# Patient Record
Sex: FEMALE | Race: OTHER
[De-identification: names, ages, dates, MRNs, and addresses within clinical notes are randomized per-mention and may not be internally consistent; named-entity substitution may affect disease eponyms.]

---

## 2017-12-30 ENCOUNTER — HOSPITAL ENCOUNTER (INPATIENT)
Dept: HOSPITAL 36 - ER | Age: 57
LOS: 3 days | Discharge: HOME | DRG: 871 | End: 2018-01-02
Attending: FAMILY MEDICINE | Admitting: FAMILY MEDICINE
Payer: MEDICARE

## 2017-12-30 DIAGNOSIS — M06.9: ICD-10-CM

## 2017-12-30 DIAGNOSIS — I10: ICD-10-CM

## 2017-12-30 DIAGNOSIS — Z82.49: ICD-10-CM

## 2017-12-30 DIAGNOSIS — J18.9: ICD-10-CM

## 2017-12-30 DIAGNOSIS — J45.909: ICD-10-CM

## 2017-12-30 DIAGNOSIS — F41.9: ICD-10-CM

## 2017-12-30 DIAGNOSIS — E78.5: ICD-10-CM

## 2017-12-30 DIAGNOSIS — Z88.0: ICD-10-CM

## 2017-12-30 DIAGNOSIS — Z88.1: ICD-10-CM

## 2017-12-30 DIAGNOSIS — A41.9: Primary | ICD-10-CM

## 2017-12-30 DIAGNOSIS — G89.29: ICD-10-CM

## 2017-12-30 DIAGNOSIS — M54.9: ICD-10-CM

## 2017-12-30 LAB
ALBUMIN SERPL-MCNC: 4.5 GM/DL (ref 3.7–5.3)
ALBUMIN/GLOB SERPL: 1.4 {RATIO} (ref 1–1.8)
ALP SERPL-CCNC: 62 U/L (ref 34–104)
ALT SERPL-CCNC: 86 U/L (ref 7–52)
ANION GAP SERPL CALC-SCNC: 13.6 MMOL/L (ref 7–16)
AST SERPL-CCNC: 41 U/L (ref 13–39)
BILIRUB SERPL-MCNC: 0.4 MG/DL (ref 0.3–1)
BUN SERPL-MCNC: 25 MG/DL (ref 7–25)
CALCIUM SERPL-MCNC: 9.7 MG/DL (ref 8.6–10.3)
CHLORIDE SERPL-SCNC: 100 MEQ/L (ref 98–107)
CHOLEST SERPL-MCNC: 142 MG/DL (ref ?–200)
CK SERPL-CCNC: 87 U/L (ref 30–223)
CO2 SERPL-SCNC: 24.7 MEQ/L (ref 21–31)
CREAT SERPL-MCNC: 1.4 MG/DL (ref 0.6–1.2)
D DIMER PPP FEU-MCNC: 1380 NG/ML (ref 100–400)
EOSINOPHIL NFR BLD: 12 % (ref 0–5)
ERYTHROCYTE [DISTWIDTH] IN BLOOD BY AUTOMATED COUNT: 14 % (ref 11.5–20)
GLOBULIN SER-MCNC: 3.3 GM/DL
GLUCOSE SERPL-MCNC: 119 MG/DL (ref 70–105)
HCT VFR BLD CALC: 40.1 % (ref 41–60)
HDLC SERPL-MCNC: 32 MG/DL (ref 23–92)
HGB BLD-MCNC: 13.5 GM/DL (ref 12–16)
INR PPP: 0.95 (ref 0.5–1.4)
LYMPHOCYTES # BLD MANUAL: 6 % (ref 20–50)
MANUAL DIFFERENTIAL PERFORMED BLD QL: YES
MCH RBC QN AUTO: 31.1 PG (ref 27–31)
MCHC RBC AUTO-ENTMCNC: 33.6 PG (ref 28–36)
MCV RBC AUTO: 92.6 FL (ref 81–100)
MONOCYTES # BLD MANUAL: 7 % (ref 2–10)
NEUTROPHILS NFR BLD AUTO: 75 % (ref 40–80)
PLATELET # BLD: 400 TH/CMM (ref 150–400)
PMV BLD AUTO: 7.7 FL
POTASSIUM SERPL-SCNC: 4.3 MEQ/L (ref 3.5–5.1)
PROTHROMBIN TIME: 9.9 SECONDS (ref 9.5–11.5)
RBC # BLD AUTO: 4.34 MIL/CMM (ref 3.8–5.1)
SODIUM SERPL-SCNC: 134 MEQ/L (ref 136–145)
TRIGL SERPL-MCNC: 262 MG/DL (ref ?–150)
WBC # BLD AUTO: 16.7 TH/CMM (ref 4.8–10.8)

## 2017-12-30 PROCEDURE — Z7610: HCPCS

## 2017-12-30 RX ADMIN — ENOXAPARIN SODIUM SCH MG: 40 INJECTION SUBCUTANEOUS at 23:07

## 2017-12-30 RX ADMIN — HYDROCODONE BITARTRATE AND ACETAMINOPHEN PRN TAB: 5; 325 TABLET ORAL at 22:06

## 2017-12-30 RX ADMIN — IPRATROPIUM BROMIDE AND ALBUTEROL SULFATE SCH ML: .5; 3 SOLUTION RESPIRATORY (INHALATION) at 20:45

## 2017-12-30 RX ADMIN — POTASSIUM CHLORIDE, DEXTROSE MONOHYDRATE AND SODIUM CHLORIDE SCH MLS/HR: 150; 5; 450 INJECTION, SOLUTION INTRAVENOUS at 21:06

## 2017-12-30 NOTE — ED PHYSICIAN CHART
ED Chief Complaint/HPI





- Patient Information


Date Seen:: 12/30/17


Time Seen:: 16:15


Chief Complaint:: Dyspnea


History of Present Illness:: 





onset x one day of dyspnea with a 3 day history of fever, cough, and congestion

; no report of H/As, S/T, neck pain, C/P, Abd. Pain, A/N/V/D/C, chills, or 

urinary s/s


Historian:: Patient, EMS


Review:: Old Chart Reviewed





ED Review of Systems





- Review of Systems


General/Constitutional: Fever, No chills, No weight loss, No weakness, No 

diaphoresis, No edema, No loss of appetite


Skin: No skin lesions, No rash, No bruising


Head: No headache, No light-headedness


Eyes: No loss of vision, No pain, No diplopia


ENT: No earache, Nasal drainage, No sore throat, No tinnitus


Neck: No neck pain, No swelling, No thyromegaly, No stiffness, No mass noted


Cardio Vascular: No chest pain, No palpitations, No PND, No orthopnea, No edema


Pulmonary: SOB, Cough, No sputum, No wheezing


GI: No nausea, No vomiting, No diarrhea, No pain, No melena, No hematochezia, 

No constipation, No hematemesis


G/U: No dysuria, No frequency, No hematuria


Ob/Gyn: No vaginal discharge, No abnormal vaginal bleed, No contraction


Musculoskeletal: No bone or joint pain, No back pain, No muscle pain


Endocrine: No polyuria, No polydipsia


Psychiatric: No prior psych history, No depression, No anxiety, No suicidal 

ideation, No homicidal ideation, No auditory hallucination, No visual 

hallucination


Hematopoietic: No bruising, No lymphadenopathy


Allergic/Immuno: No urticaria, No angioedema


Neurological: No syncope, No focal symptoms, No weakness, No paresthesia, No 

headache, No seizure, No dizziness, No confusion, No vertigo





ED Past Medical History





- Past Medical History


Obtainable: Yes


Past Medical History: HTN


Family History: HTN


Social History: Non Smoker, No Alcohol, No Drug Use, Single, Care Facility


Surgical History: None


Psychiatricy History: None


Medication: Reviewed





ED Physical Exam





- Physical Examination


General/Constitutional: Awake, Well-developed, well-nourished, Alert, No 

distress, GCS 15, Non-toxic appearing, Ambulatory


Head: Atraumatic


Eyes: Lids, conjuctiva normal, PERRL, EOMI


Skin: Nl inspection, No rash, No skin lesions, No ecchymosis, Well hydrated, No 

lymphadenopathy


ENMT: External ears, nose nl, TM canals nl, Nasal exam nl, Lips, teeth, gums nl

, Oropharynx nl, Tonsils nl


Neck: Nontender, Full ROM w/o pain, No JVD, No nuchal rigidity, No bruit, No 

mass, No stridor


Respiratory: Nl effort/Exclusion


Other Respiratory comments:: 





Lungs: + Rales and Rhonchi


Cardio Vascular: RRR, No murmur, gallop, rubs, NL S1 S2, Carotid/Femoral/Distal 

pulses equal bilaterally


GI: No tenderness/rebounding/guarding, No organomegaly, No hernia, Normal BS's, 

Nondistended, No mass/bruits, No McBurney tenderness


: No CVA tenderness


Extremities: No tenderness or effusion, Full ROM, normal strength in all 

extremities, No edema, Normal digits & nails


Neuro/Psych: Alert/oriented, DTR's symmetric, Normal sensory exam, Normal motor 

strength, Judgement/insight normal, Mood normal, Normal gait, No focal deficits


Misc: Normal back, No paraspinal tenderness





ED Labs/Radiology/EKG Results





- Lab Results


Comments:: 





WBC: 16.7





- Radiology Results


Comments:: 





+ RML Infiltrate





- EKG Interpretations


EKG Time:: 16:38


Rate & Rhythm: 127; ST


Comments:: 





non-specific st-t changes





ED Septic Shock





- .


Is Septic Shock (SBP<90, OR Lactate>4 mmol\L) present?: No





ED Reassessment (Disposition)





- Reassessment


Reassessment Condition:: Improved





- Diagnosis


Diagnosis:: 





Fever; Leukocytosis; Sepsis; PNA; Cough/Congestion; Dyspnea





- Aftercare/Follow up Instructions


Aftercare/Follow-Up Instructions:: Counseled pt regarding lab results/diagnosis 

& need follow up, Counseled pt & family regarding lab results/diagnosis & need 

follow up





- Patient Disposition


Discharge/Transfer:: Acute Care w/in this hosp


Accepting Physician:: Dr. barrett


Time Called:: 1815


Time Responded:: 18:15


Admitted to:: Telemetry


Spoke to:: Dr. Barrett


Admitting Medical Physician:: Dr. Kadhium


Condition at Disposition:: Stable, Improved

## 2017-12-30 NOTE — HISTORY & PHYSICAL
ADMIT DATE:  12/30/2017



CHIEF COMPLAINT:  Cough and congestion for a few days' duration.



HISTORY OF PRESENT ILLNESS:  The patient is a 57-year-old female with a long

history of asthma and chronic back pain, presented to the Emergency Room with

cough and congestion for a few days' duration, evaluated by the physician. 

Workup sent for pneumonia, admitted to medical floor, started on IV fluid and

antibiotic.  Denies any nausea, any vomiting, chest pain.  No dysuria or

hematuria.



PAST MEDICAL HISTORY:  Significant for asthma and chronic back pain.



PAST SURGICAL HISTORY:  Back surgery.



ALLERGIES:  ERYTHROMYCIN AND PENICILLIN.



SOCIAL HISTORY:  No smoking, no alcohol, no drug.



FAMILY HISTORY:  Noncontributory.



REVIEW OF SYSTEMS:

IMMUNOSYSTEM:  No history of chronic renal disorder.

CARDIOVASCULAR SYSTEM:  Coronary artery disease.

ENDOCRINE SYSTEM:  No diabetes or thyroid problem.

GASTROINTESTINAL SYSTEM:  No upper or lower gastrointestinal bleed.

NEUROLOGICAL SYSTEM:  No seizure disorder.

SKELETOMUSCULAR SYSTEM:  No muscular dystrophy.

RESPIRATORY SYSTEM:  She has history of asthma.



PHYSICAL EXAMINATION:

GENERAL:  She is awake, alert, oriented, mildly congested, not in distress.

VITAL SIGNS:  Temperature 100.5, heart rate 118 and blood pressure 121/66.

HEENT:  Normocephalic.  Pupils equal, reactive to light and accommodation. 

Sclerae clear.

NECK:  Supple.  Negative for lymphadenopathy, JVD or bruit.

CHEST:  Entry of air bilateral diminished associated with rhonchi.

HEART:  S1 and S2 normal.  No gallop rhythm.

ABDOMEN:  Soft and bowel sounds positive.

EXTREMITIES:  No edema.

NEUROLOGIC:  She is awake, alert and oriented.  No focal motor or sensory

deficits.  Cranial nerves 2-12 intact.



LABORATORY DATA:  White blood cell 16.7, hemoglobin 13.5, hematocrit 40.1 and

platelet 400.  PT 9.9, INR 0.95.  Sodium 134, potassium 4.3, BUN 25, creatinine

0.4 and glucose 119.



ASSESSMENT:

1.  Pneumonia.

2.  Asthma.

3.  Chronic back pain.

4.  Hyperlipidemia.



PLAN:  The patient is in the hospital under Dr. Barrett's service, started on IV

fluid, antibiotic and breathing treatment.  The patient will resume her home

medication.  Influenza A and B screen ordered.  The patient is a full code. 

Lovenox 40 subcutaneous daily ordered.





DD: 12/30/2017 22:39

DT: 12/30/2017 23:47

JOB# [id number]  [id number]

## 2017-12-31 LAB
ALBUMIN SERPL-MCNC: 3.8 GM/DL (ref 3.7–5.3)
ALBUMIN/GLOB SERPL: 1.4 {RATIO} (ref 1–1.8)
ALP SERPL-CCNC: 48 U/L (ref 34–104)
ALT SERPL-CCNC: 58 U/L (ref 7–52)
ANION GAP SERPL CALC-SCNC: 13.8 MMOL/L (ref 7–16)
APPEARANCE UR: CLEAR
AST SERPL-CCNC: 23 U/L (ref 13–39)
BACTERIA #/AREA URNS HPF: (no result) /HPF
BASOPHILS # BLD AUTO: 0 TH/CUMM (ref 0–0.2)
BASOPHILS NFR BLD AUTO: 0.1 % (ref 0–2)
BILIRUB SERPL-MCNC: 0.4 MG/DL (ref 0.3–1)
BILIRUB UR-MCNC: NEGATIVE MG/DL
BUN SERPL-MCNC: 31 MG/DL (ref 7–25)
CALCIUM SERPL-MCNC: 8.5 MG/DL (ref 8.6–10.3)
CHLORIDE SERPL-SCNC: 100 MEQ/L (ref 98–107)
CO2 SERPL-SCNC: 25 MEQ/L (ref 21–31)
COLOR UR: YELLOW
CREAT SERPL-MCNC: 2.2 MG/DL (ref 0.6–1.2)
EOSINOPHIL # BLD AUTO: 2.6 TH/CMM (ref 0.1–0.4)
EOSINOPHIL NFR BLD AUTO: 16.5 % (ref 0–5)
EPI CELLS URNS QL MICRO: (no result) /LPF
ERYTHROCYTE [DISTWIDTH] IN BLOOD BY AUTOMATED COUNT: 13.8 % (ref 11.5–20)
FLUAV AG NPH QL IA: (no result)
FLUBV AG NPH QL IA: (no result)
GLOBULIN SER-MCNC: 2.7 GM/DL
GLUCOSE SERPL-MCNC: 114 MG/DL (ref 70–105)
GLUCOSE UR STRIP-MCNC: NEGATIVE MG/DL
HCT VFR BLD CALC: 34.3 % (ref 41–60)
HGB BLD-MCNC: 11.9 GM/DL (ref 12–16)
KETONES UR STRIP-MCNC: NEGATIVE MG/DL
LEUKOCYTE ESTERASE UR-ACNC: NEGATIVE
LYMPHOCYTE AB SER FC-ACNC: 1.6 TH/CMM (ref 1.5–3)
LYMPHOCYTES NFR BLD AUTO: 10.3 % (ref 20–50)
MCH RBC QN AUTO: 32.1 PG (ref 27–31)
MCHC RBC AUTO-ENTMCNC: 34.8 PG (ref 28–36)
MCV RBC AUTO: 92.4 FL (ref 81–100)
MICRO URNS: YES
MONOCYTES # BLD AUTO: 1.3 TH/CMM (ref 0.3–1)
MONOCYTES NFR BLD AUTO: 8 % (ref 2–10)
NEUTROPHILS # BLD: 10.4 TH/CMM (ref 1.8–8)
NEUTROPHILS NFR BLD AUTO: 65.1 % (ref 40–80)
NITRITE UR QL STRIP: NEGATIVE
PH UR STRIP: 6 [PH] (ref 4.6–8)
PLATELET # BLD: 350 TH/CMM (ref 150–400)
PMV BLD AUTO: 8.2 FL
POTASSIUM SERPL-SCNC: 3.8 MEQ/L (ref 3.5–5.1)
PROT UR STRIP-MCNC: NEGATIVE MG/DL
RBC # BLD AUTO: 3.71 MIL/CMM (ref 3.8–5.1)
RBC # UR STRIP: NEGATIVE /UL
RBC #/AREA URNS HPF: (no result) /HPF (ref 0–5)
SODIUM SERPL-SCNC: 135 MEQ/L (ref 136–145)
SP GR UR STRIP: 1.02 (ref 1–1.03)
URINALYSIS COMPLETE PNL UR: (no result)
UROBILINOGEN UR STRIP-ACNC: 0.2 E.U./DL (ref 0.2–1)
WBC # BLD AUTO: 15.9 TH/CMM (ref 4.8–10.8)
WBC #/AREA URNS HPF: (no result) /HPF (ref 0–5)

## 2017-12-31 RX ADMIN — IPRATROPIUM BROMIDE AND ALBUTEROL SULFATE SCH ML: .5; 3 SOLUTION RESPIRATORY (INHALATION) at 19:19

## 2017-12-31 RX ADMIN — HYDROCODONE BITARTRATE AND ACETAMINOPHEN PRN TAB: 5; 325 TABLET ORAL at 23:31

## 2017-12-31 RX ADMIN — ENOXAPARIN SODIUM SCH MG: 40 INJECTION SUBCUTANEOUS at 22:33

## 2017-12-31 RX ADMIN — HYDROCODONE BITARTRATE AND ACETAMINOPHEN PRN TAB: 5; 325 TABLET ORAL at 12:41

## 2017-12-31 RX ADMIN — POTASSIUM CHLORIDE, DEXTROSE MONOHYDRATE AND SODIUM CHLORIDE SCH MLS/HR: 150; 5; 450 INJECTION, SOLUTION INTRAVENOUS at 21:58

## 2017-12-31 RX ADMIN — IPRATROPIUM BROMIDE AND ALBUTEROL SULFATE SCH ML: .5; 3 SOLUTION RESPIRATORY (INHALATION) at 01:24

## 2017-12-31 RX ADMIN — ENOXAPARIN SODIUM SCH MG: 40 INJECTION SUBCUTANEOUS at 10:16

## 2017-12-31 RX ADMIN — IPRATROPIUM BROMIDE AND ALBUTEROL SULFATE SCH ML: .5; 3 SOLUTION RESPIRATORY (INHALATION) at 07:23

## 2017-12-31 RX ADMIN — LEVOFLOXACIN SCH MLS/HR: 5 INJECTION INTRAVENOUS at 18:13

## 2017-12-31 RX ADMIN — POTASSIUM CHLORIDE, DEXTROSE MONOHYDRATE AND SODIUM CHLORIDE SCH MLS/HR: 150; 5; 450 INJECTION, SOLUTION INTRAVENOUS at 18:18

## 2017-12-31 RX ADMIN — IPRATROPIUM BROMIDE AND ALBUTEROL SULFATE SCH ML: .5; 3 SOLUTION RESPIRATORY (INHALATION) at 12:04

## 2017-12-31 NOTE — INTERNAL MEDICINE PROG NOTE
Internal Medicine Subjective





- Subjective


Service Date: 12/31/17


Patient seen and examined:: with staff (SHE FEELS BETTER)


Patient is:: awake, verbal, in bed


Patient Complaints of:: congestion


Per staff patient has:: no adverse event, eating well





Internal Medicine Objective





- Results


Result Diagrams: 


 12/31/17 05:20





 12/31/17 05:20


Recent Labs: 


 Laboratory Last Values











WBC  15.9 Th/cmm (4.8-10.8)  H  12/31/17  05:20    


 


RBC  3.71 Mil/cmm (3.80-5.10)  L  12/31/17  05:20    


 


Hgb  11.9 gm/dL (12-16)  L  12/31/17  05:20    


 


Hct  34.3 % (41.0-60)  L D 12/31/17  05:20    


 


MCV  92.4 fl ()   12/31/17  05:20    


 


MCH  32.1 pg (27.0-31.0)  H  12/31/17  05:20    


 


MCHC Differential  34.8 pg (28.0-36.0)   12/31/17  05:20    


 


RDW  13.8 % (11.5-20.0)   12/31/17  05:20    


 


Plt Count  350 Th/cmm (150-400)   12/31/17  05:20    


 


MPV  8.2 fl  12/31/17  05:20    


 


Neutrophils %  65.1 % (40.0-80.0)   12/31/17  05:20    


 


Lymphocytes %  10.3 % (20.0-50.0)  L  12/31/17  05:20    


 


Monocytes %  8.0 % (2.0-10.0)   12/31/17  05:20    


 


Eosinophils %  16.5 % (0.0-5.0)  H  12/31/17  05:20    


 


Basophils %  0.1 % (0.0-2.0)   12/31/17  05:20    


 


Neutrophils (Manual)  75 % (40-80)   12/30/17  16:55    


 


Lymphocytes  6 % (20-50)  L  12/30/17  16:55    


 


Monocytes  7 % (2-10)   12/30/17  16:55    


 


Eosinophils  12 % (0-5)  H  12/30/17  16:55    


 


PT  9.9 SECONDS (9.5-11.5)   12/30/17  16:55    


 


INR  0.95  (0.5-1.4)   12/30/17  16:55    


 


PTT (Actin FS)  27.5 SECONDS (26.0-38.0)   12/30/17  16:55    


 


D-Dimer  1380 ng/mL (100-400)  H  12/30/17  16:55    


 


Sodium  135 mEq/L (136-145)  L  12/31/17  05:20    


 


Potassium  3.8 mEq/L (3.5-5.1)   12/31/17  05:20    


 


Chloride  100 mEq/L ()   12/31/17  05:20    


 


Carbon Dioxide  25.0 mEq/L (21.0-31.0)   12/31/17  05:20    


 


Anion Gap  13.8  (7.0-16.0)   12/31/17  05:20    


 


BUN  31 mg/dL (7-25)  H  12/31/17  05:20    


 


Creatinine  2.2 mg/dL (0.6-1.2)  H  12/31/17  05:20    


 


Est GFR ( Amer)  29.7 ml/min (>90)   12/31/17  05:20    


 


Est GFR (Non-Af Amer)  24.5 ml/min  12/31/17  05:20    


 


BUN/Creatinine Ratio  14.1   12/31/17  05:20    


 


Glucose  114 mg/dL ()  H  12/31/17  05:20    


 


Whole Bld Lactic Acid  1.93 mmol/L (0.60-1.99)   12/30/17  16:55    


 


Calcium  8.5 mg/dL (8.6-10.3)  L  12/31/17  05:20    


 


Total Bilirubin  0.4 mg/dL (0.3-1.0)   12/31/17  05:20    


 


AST  23 U/L (13-39)   12/31/17  05:20    


 


ALT  58 U/L (7-52)  H  12/31/17  05:20    


 


Alkaline Phosphatase  48 U/L ()   12/31/17  05:20    


 


Creatine Kinase  87 U/L ()   12/30/17  16:55    


 


Troponin I  0.01 ng/mL (0.01-0.05)   12/30/17  16:55    


 


B-Natriuretic Peptide  14.7 pg/mL (5.0-100.0)   12/30/17  16:55    


 


Total Protein  6.5 gm/dL (6.0-8.3)   12/31/17  05:20    


 


Albumin  3.8 gm/dL (3.7-5.3)   12/31/17  05:20    


 


Globulin  2.7 gm/dL  12/31/17  05:20    


 


Albumin/Globulin Ratio  1.4  (1.0-1.8)   12/31/17  05:20    


 


Triglycerides  262 mg/dL (<150)  H  12/30/17  16:55    


 


Cholesterol  142 mg/dL (<200)   12/30/17  16:55    


 


LDL Cholesterol Direct  72 mg/dL ()  L  12/30/17  16:55    


 


HDL Cholesterol  32 mg/dL (23-92)   12/30/17  16:55    


 


Serum Pregnancy, Qual  NEGATIVE  (NEGATIVE)   12/30/17  16:55    


 


Influenza A (Rapid)  NEG FOR INF A   12/30/17  23:00    


 


Influenza B (Rapid)  NEG FOR INF B   12/30/17  23:00    














- Physical Exam


Vitals and I&O: 


 Vital Signs











Temp  97.8 F   12/31/17 13:00


 


Pulse  94   12/31/17 13:00


 


Resp  18   12/31/17 13:00


 


BP  107/51   12/31/17 13:00


 


Pulse Ox  96   12/31/17 13:00








 Intake & Output











 12/30/17 12/31/17 12/31/17





 18:59 06:59 18:59


 


Intake Total  650 


 


Output Total  0 


 


Balance  650 


 


Weight (lbs)  99.79 kg 


 


Intake:   


 


  Intake, IV Amount  550 


 


    Vancomycin HCl 1.25 gm In  250 





    Sodium Chloride 0.9% 250   





    ml @ 165 mls/hr IV ONCE   





    ONE Rx#:[id number]   


 


  Oral  100 


 


  Tube Feeding  0 


 


  TPN/PPN  0 


 


  Blood Product  0 


 


  Lipid  0 


 


  Albumin  0 


 


  Other  0 


 


Output:   


 


  Gastric Drainage  0 


 


  Urine  0 


 


  Stool  0 


 


  Urine/Stool Mix  0 


 


  Emesis  0 


 


  Hemodialysis  0 


 


  Other  0 


 


Other:   


 


  # Voids  1 


 


  # Bowel Movements  0 











Active Medications: 


Current Medications





Acetaminophen (Tylenol)  650 mg PO Q4H PRN


   PRN Reason: Fever > 101


   Stop: 02/28/18 19:14


Acetaminophen/Hydrocodone Bitart (Norco 5mg/325mg)  1 tab PO Q6H PRN


   PRN Reason: Pain (Moderate)


   Stop: 02/28/18 21:29


   Last Admin: 12/31/17 12:41 Dose:  1 tab


Albuterol/Ipratropium (Duoneb Neb)  3 ml HHN Q6HRT [facility]


   Stop: 02/28/18 19:29


   Last Admin: 12/31/17 12:04 Dose:  3 ml


Alprazolam (Xanax)  1 mg PO HS PRN; Protocol


   PRN Reason: Anxiety


   Stop: 02/28/18 21:30


Enoxaparin Sodium (Lovenox)  40 mg SUBQ Q12H [facility]


   Stop: 02/28/18 22:59


   Last Admin: 12/31/17 10:16 Dose:  40 mg


Hydroxyzine HCl (Atarax)  10 mg PO Q6H PRN; Protocol


   PRN Reason: Itching


   Stop: 03/01/18 17:15


Potassium Chloride/Dextrose/Sod Cl (D5-0.45ns W/20 Meq Kcl)  1,000 mls @ 75 mls/

hr IV .[id number] [facility]


   Stop: 02/28/18 19:16


   Last Admin: 12/30/17 21:06 Dose:  75 mls/hr


Levofloxacin (Levaquin Pb)  500 mg in 100 mls @ 100 mls/hr IV Q24HR [facility]


   Stop: 03/01/18 17:59


Miscellaneous (Vancomycin Iv Per Pharmacy)  1 ea  PRN [facility]


   Stop: 03/01/18 02:14


Miscellaneous (Probiotic Screen)  1 ea  PRN PRN


   PRN Reason: PROTOCOL


   Stop: 03/01/18 10:29


Montelukast Sodium (Singulair)  10 mg PO DAILY [facility]


   Stop: 03/01/18 08:59


   Last Admin: 12/31/17 10:16 Dose:  10 mg


Prednisone (Deltasone)  3 mg PO DAILY [facility]


   Stop: 03/02/18 08:59








General: alert


HEENT: NC/AT, anicteric sclerae, throat clear


Neck: Supple, No JVD, No thyromegaly, +2 carotid pulse wo bruit, No LAD


Lungs: congested


Cardiovascular: RRR, Normal S1, Normal S2, without murmur


Abdomen: non-tender, non-distended


Extremities: clear


Neurological: no change





Internal Medicine Assmt/Plan





- Assessment


Assessment: 





1.PNEUMONIA


2.ASHMA





- Plan


Plan: 





CONTINUE ON CURRENT MEDICATION AND DIET.

## 2017-12-31 NOTE — DIAGNOSTIC IMAGING REPORT
Portable chest x-ray



HISTORY: Cough



The heart size appears somewhat enlarged. No focal pulmonary processes.

No hilar or mediastinal abnormalities. Surgical changes seen within the

cervical spine.



IMPRESSION:

1. No acute focal pulmonary processes

2. Generous heart size

## 2018-01-01 RX ADMIN — LEVOFLOXACIN SCH MLS/HR: 5 INJECTION INTRAVENOUS at 18:09

## 2018-01-01 RX ADMIN — IPRATROPIUM BROMIDE AND ALBUTEROL SULFATE SCH ML: .5; 3 SOLUTION RESPIRATORY (INHALATION) at 07:22

## 2018-01-01 RX ADMIN — ENOXAPARIN SODIUM SCH MG: 40 INJECTION SUBCUTANEOUS at 10:30

## 2018-01-01 RX ADMIN — PREDNISONE SCH MG: 5 SOLUTION ORAL at 09:30

## 2018-01-01 RX ADMIN — HYDROCODONE BITARTRATE AND ACETAMINOPHEN PRN TAB: 5; 325 TABLET ORAL at 20:58

## 2018-01-01 RX ADMIN — IPRATROPIUM BROMIDE AND ALBUTEROL SULFATE SCH: .5; 3 SOLUTION RESPIRATORY (INHALATION) at 01:21

## 2018-01-01 RX ADMIN — Medication SCH EACH: at 10:32

## 2018-01-01 RX ADMIN — IPRATROPIUM BROMIDE AND ALBUTEROL SULFATE SCH ML: .5; 3 SOLUTION RESPIRATORY (INHALATION) at 18:46

## 2018-01-01 RX ADMIN — IPRATROPIUM BROMIDE AND ALBUTEROL SULFATE SCH ML: .5; 3 SOLUTION RESPIRATORY (INHALATION) at 13:40

## 2018-01-01 RX ADMIN — ENOXAPARIN SODIUM SCH MG: 40 INJECTION SUBCUTANEOUS at 23:26

## 2018-01-01 NOTE — INFECTIOUS DISEASE PROG NOTE
Infectious Disease Subjective





- Review of Systems


Service Date: 12/31/17


Subjective: 





cc pneuminia


hpi- wbc decreased sp cx negative


ros o fver


o/e vs chets claera bd soft ext pulse





Infectious Disease Objective





- Results


Result Diagrams: 


 12/31/17 05:20





 12/31/17 05:20


Recent Labs: 


 Laboratory Last Values











WBC  15.9 Th/cmm (4.8-10.8)  H  12/31/17  05:20    


 


RBC  3.71 Mil/cmm (3.80-5.10)  L  12/31/17  05:20    


 


Hgb  11.9 gm/dL (12-16)  L  12/31/17  05:20    


 


Hct  34.3 % (41.0-60)  L D 12/31/17  05:20    


 


MCV  92.4 fl ()   12/31/17  05:20    


 


MCH  32.1 pg (27.0-31.0)  H  12/31/17  05:20    


 


MCHC Differential  34.8 pg (28.0-36.0)   12/31/17  05:20    


 


RDW  13.8 % (11.5-20.0)   12/31/17  05:20    


 


Plt Count  350 Th/cmm (150-400)   12/31/17  05:20    


 


MPV  8.2 fl  12/31/17  05:20    


 


Neutrophils %  65.1 % (40.0-80.0)   12/31/17  05:20    


 


Lymphocytes %  10.3 % (20.0-50.0)  L  12/31/17  05:20    


 


Monocytes %  8.0 % (2.0-10.0)   12/31/17  05:20    


 


Eosinophils %  16.5 % (0.0-5.0)  H  12/31/17  05:20    


 


Basophils %  0.1 % (0.0-2.0)   12/31/17  05:20    


 


Neutrophils (Manual)  75 % (40-80)   12/30/17  16:55    


 


Lymphocytes  6 % (20-50)  L  12/30/17  16:55    


 


Monocytes  7 % (2-10)   12/30/17  16:55    


 


Eosinophils  12 % (0-5)  H  12/30/17  16:55    


 


PT  9.9 SECONDS (9.5-11.5)   12/30/17  16:55    


 


INR  0.95  (0.5-1.4)   12/30/17  16:55    


 


PTT (Actin FS)  27.5 SECONDS (26.0-38.0)   12/30/17  16:55    


 


D-Dimer  1380 ng/mL (100-400)  H  12/30/17  16:55    


 


Sodium  135 mEq/L (136-145)  L  12/31/17  05:20    


 


Potassium  3.8 mEq/L (3.5-5.1)   12/31/17  05:20    


 


Chloride  100 mEq/L ()   12/31/17  05:20    


 


Carbon Dioxide  25.0 mEq/L (21.0-31.0)   12/31/17  05:20    


 


Anion Gap  13.8  (7.0-16.0)   12/31/17  05:20    


 


BUN  31 mg/dL (7-25)  H  12/31/17  05:20    


 


Creatinine  2.2 mg/dL (0.6-1.2)  H  12/31/17  05:20    


 


Est GFR ( Amer)  29.7 ml/min (>90)   12/31/17  05:20    


 


Est GFR (Non-Af Amer)  24.5 ml/min  12/31/17  05:20    


 


BUN/Creatinine Ratio  14.1   12/31/17  05:20    


 


Glucose  114 mg/dL ()  H  12/31/17  05:20    


 


Whole Bld Lactic Acid  1.93 mmol/L (0.60-1.99)   12/30/17  16:55    


 


Calcium  8.5 mg/dL (8.6-10.3)  L  12/31/17  05:20    


 


Total Bilirubin  0.4 mg/dL (0.3-1.0)   12/31/17  05:20    


 


AST  23 U/L (13-39)   12/31/17  05:20    


 


ALT  58 U/L (7-52)  H  12/31/17  05:20    


 


Alkaline Phosphatase  48 U/L ()   12/31/17  05:20    


 


Creatine Kinase  87 U/L ()   12/30/17  16:55    


 


Troponin I  0.01 ng/mL (0.01-0.05)   12/30/17  16:55    


 


B-Natriuretic Peptide  14.7 pg/mL (5.0-100.0)   12/30/17  16:55    


 


Total Protein  6.5 gm/dL (6.0-8.3)   12/31/17  05:20    


 


Albumin  3.8 gm/dL (3.7-5.3)   12/31/17  05:20    


 


Globulin  2.7 gm/dL  12/31/17  05:20    


 


Albumin/Globulin Ratio  1.4  (1.0-1.8)   12/31/17  05:20    


 


Triglycerides  262 mg/dL (<150)  H  12/30/17  16:55    


 


Cholesterol  142 mg/dL (<200)   12/30/17  16:55    


 


LDL Cholesterol Direct  72 mg/dL ()  L  12/30/17  16:55    


 


HDL Cholesterol  32 mg/dL (23-92)   12/30/17  16:55    


 


Serum Pregnancy, Qual  NEGATIVE  (NEGATIVE)   12/30/17  16:55    


 


Urine Source  MIDSTREAM   12/31/17  16:05    


 


Urine Color  YELLOW   12/31/17  16:05    


 


Urine Clarity  CLEAR  (CLEAR)   12/31/17  16:05    


 


Urine pH  6.0  (4.6 - 8.0)   12/31/17  16:05    


 


Ur Specific Gravity  1.020  (1.005-1.030)   12/31/17  16:05    


 


Urine Protein  NEGATIVE mg/dL (NEGATIVE)   12/31/17  16:05    


 


Urine Glucose (UA)  NEGATIVE mg/dL (NEGATIVE)   12/31/17  16:05    


 


Urine Ketones  NEGATIVE mg/dL (NEGATIVE)   12/31/17  16:05    


 


Urine Blood  NEGATIVE  (NEGATIVE)   12/31/17  16:05    


 


Urine Nitrate  NEGATIVE  (NEGATIVE)   12/31/17  16:05    


 


Urine Bilirubin  NEGATIVE  (NEGATIVE)   12/31/17  16:05    


 


Urine Urobilinogen  0.2 E.U./dL (0.2 - 1.0)   12/31/17  16:05    


 


Ur Leukocyte Esterase  NEGATIVE  (NEGATIVE)   12/31/17  16:05    


 


Urine RBC  NONE SEEN /hpf (0-5)   12/31/17  16:05    


 


Urine WBC  NONE SEEN /hpf (0-5)   12/31/17  16:05    


 


Ur Epithelial Cells  NONE SEEN /lpf (FEW)   12/31/17  16:05    


 


Urine Bacteria  NONE SEEN /hpf (NONE SEEN)   12/31/17  16:05    


 


Vancomycin Trough  13.1 ug/mL (10-20)   01/01/18  06:00    


 


Influenza A (Rapid)  NEG FOR INF A   12/30/17  23:00    


 


Influenza B (Rapid)  NEG FOR INF B   12/30/17  23:00    














- Physical Exam


Vitals and I&O: 


 Vital Signs











Temp  97.6 F   01/01/18 18:56


 


Pulse  90   01/01/18 18:56


 


Resp  20   01/01/18 18:56


 


BP  115/52   01/01/18 18:56


 


Pulse Ox  96   01/01/18 18:56








 Intake & Output











 01/01/18 01/01/18 01/02/18





 06:59 18:59 06:59


 


Intake Total 1100 250 


 


Balance 1100 250 


 


Weight (lbs) 104.236 kg 103.873 kg 


 


Intake:   


 


  Intake, IV Amount 1100  


 


    D5-0.45NS w/20 mEq KCL 1, 1000  





    000 ml @ 75 mls/hr IV .   





    [id number] [facility] Rx#:[id number]   


 


    Levofloxacin 500mg/100mL 100  





    500 mg In 100 ml @ 100   





    mls/hr IV Q24HR [facility] Rx#:   





    [id number]   


 


  Oral  250 


 


Other:   


 


  # Voids  3 


 


  # Bowel Movements  0 











Active Medications: 


Current Medications





Acetaminophen (Tylenol)  650 mg PO Q4H PRN


   PRN Reason: Fever > 101


   Stop: 02/28/18 19:14


   Last Admin: 01/01/18 04:30 Dose:  650 mg


Acetaminophen/Hydrocodone Bitart (Norco 5mg/325mg)  1 tab PO Q6H PRN


   PRN Reason: Pain (Moderate)


   Stop: 02/28/18 21:29


   Last Admin: 12/31/17 23:31 Dose:  1 tab


Albuterol Sulfate (Albuterol 2.5mg/3ml Neb Ud)  2.5 mg HHN Q6HRT [facility]


   Stop: 01/03/18 06:59


Albuterol/Ipratropium (Duoneb Neb)  3 ml HHN Q6HRT [facility]


   Stop: 02/28/18 19:29


   Last Admin: 01/01/18 18:46 Dose:  3 ml


Alprazolam (Xanax)  1 mg PO HS PRN; Protocol


   PRN Reason: Anxiety


   Stop: 02/28/18 21:30


   Last Admin: 12/31/17 21:51 Dose:  1 mg


Enoxaparin Sodium (Lovenox)  40 mg SUBQ Q12H [facility]


   Stop: 02/28/18 22:59


   Last Admin: 01/01/18 10:30 Dose:  40 mg


Hydroxyzine HCl (Atarax)  10 mg PO Q6H PRN; Protocol


   PRN Reason: Itching


   Stop: 03/01/18 17:15


   Last Admin: 12/31/17 18:12 Dose:  10 mg


Potassium Chloride/Dextrose/Sod Cl (D5-0.45ns W/20 Meq Kcl)  1,000 mls @ 75 mls/

hr IV .[id number] ROJELIO


   Stop: 02/28/18 19:16


   Last Admin: 12/31/17 21:58 Dose:  75 mls/hr


Levofloxacin (Levaquin Pb)  500 mg in 100 mls @ 100 mls/hr IV Q24HR ROJELIO


   Stop: 03/01/18 17:59


   Last Admin: 01/01/18 18:09 Dose:  100 mls/hr


Ipratropium Bromide (Atrovent Neb 0.5mg/2.5ml)  0.5 mg HHN Q6HRT ROJELIO


   Stop: 01/03/18 06:59


Lactobacillus Rhamnosus (Culturelle 15b)  1 each PO DAILY ROJELIO


   Stop: 03/02/18 09:59


   Last Admin: 01/01/18 10:32 Dose:  1 each


Miscellaneous (Vancomycin Iv Per Pharmacy)  1 ea  PRN ROJELIO


   Stop: 03/01/18 02:14


Miscellaneous (Probiotic Screen)  1 ea  PRN PRN


   PRN Reason: PROTOCOL


   Stop: 03/01/18 10:29


Montelukast Sodium (Singulair)  10 mg PO DAILY ROJELIO


   Stop: 03/01/18 08:59


   Last Admin: 01/01/18 09:30 Dose:  10 mg


Prednisone (Prednisone)  3 mg PO DAILY ROJELIO


   Stop: 03/02/18 08:59


   Last Admin: 01/01/18 09:30 Dose:  3 mg











Infectious Disease Assmt/Plan





- Problem List


Patient Problems: 


All Active Problems





COUGH AND BODY ACHES, LOW SAO2 (Acute)

## 2018-01-01 NOTE — INTERNAL MEDICINE PROG NOTE
Internal Medicine Subjective





- Subjective


Service Date: 01/01/18


Patient seen and examined:: with staff (SHE FEELS WELL,NO SOB.)


Patient is:: awake, verbal, in bed


Patient Complaints of:: congestion


Per staff patient has:: no adverse event, eating well





Internal Medicine Objective





- Results


Result Diagrams: 


 12/31/17 05:20





 12/31/17 05:20


Recent Labs: 


 Laboratory Last Values











WBC  15.9 Th/cmm (4.8-10.8)  H  12/31/17  05:20    


 


RBC  3.71 Mil/cmm (3.80-5.10)  L  12/31/17  05:20    


 


Hgb  11.9 gm/dL (12-16)  L  12/31/17  05:20    


 


Hct  34.3 % (41.0-60)  L D 12/31/17  05:20    


 


MCV  92.4 fl ()   12/31/17  05:20    


 


MCH  32.1 pg (27.0-31.0)  H  12/31/17  05:20    


 


MCHC Differential  34.8 pg (28.0-36.0)   12/31/17  05:20    


 


RDW  13.8 % (11.5-20.0)   12/31/17  05:20    


 


Plt Count  350 Th/cmm (150-400)   12/31/17  05:20    


 


MPV  8.2 fl  12/31/17  05:20    


 


Neutrophils %  65.1 % (40.0-80.0)   12/31/17  05:20    


 


Lymphocytes %  10.3 % (20.0-50.0)  L  12/31/17  05:20    


 


Monocytes %  8.0 % (2.0-10.0)   12/31/17  05:20    


 


Eosinophils %  16.5 % (0.0-5.0)  H  12/31/17  05:20    


 


Basophils %  0.1 % (0.0-2.0)   12/31/17  05:20    


 


Neutrophils (Manual)  75 % (40-80)   12/30/17  16:55    


 


Lymphocytes  6 % (20-50)  L  12/30/17  16:55    


 


Monocytes  7 % (2-10)   12/30/17  16:55    


 


Eosinophils  12 % (0-5)  H  12/30/17  16:55    


 


PT  9.9 SECONDS (9.5-11.5)   12/30/17  16:55    


 


INR  0.95  (0.5-1.4)   12/30/17  16:55    


 


PTT (Actin FS)  27.5 SECONDS (26.0-38.0)   12/30/17  16:55    


 


D-Dimer  1380 ng/mL (100-400)  H  12/30/17  16:55    


 


Sodium  135 mEq/L (136-145)  L  12/31/17  05:20    


 


Potassium  3.8 mEq/L (3.5-5.1)   12/31/17  05:20    


 


Chloride  100 mEq/L ()   12/31/17  05:20    


 


Carbon Dioxide  25.0 mEq/L (21.0-31.0)   12/31/17  05:20    


 


Anion Gap  13.8  (7.0-16.0)   12/31/17  05:20    


 


BUN  31 mg/dL (7-25)  H  12/31/17  05:20    


 


Creatinine  2.2 mg/dL (0.6-1.2)  H  12/31/17  05:20    


 


Est GFR ( Amer)  29.7 ml/min (>90)   12/31/17  05:20    


 


Est GFR (Non-Af Amer)  24.5 ml/min  12/31/17  05:20    


 


BUN/Creatinine Ratio  14.1   12/31/17  05:20    


 


Glucose  114 mg/dL ()  H  12/31/17  05:20    


 


Whole Bld Lactic Acid  1.93 mmol/L (0.60-1.99)   12/30/17  16:55    


 


Calcium  8.5 mg/dL (8.6-10.3)  L  12/31/17  05:20    


 


Total Bilirubin  0.4 mg/dL (0.3-1.0)   12/31/17  05:20    


 


AST  23 U/L (13-39)   12/31/17  05:20    


 


ALT  58 U/L (7-52)  H  12/31/17  05:20    


 


Alkaline Phosphatase  48 U/L ()   12/31/17  05:20    


 


Creatine Kinase  87 U/L ()   12/30/17  16:55    


 


Troponin I  0.01 ng/mL (0.01-0.05)   12/30/17  16:55    


 


B-Natriuretic Peptide  14.7 pg/mL (5.0-100.0)   12/30/17  16:55    


 


Total Protein  6.5 gm/dL (6.0-8.3)   12/31/17  05:20    


 


Albumin  3.8 gm/dL (3.7-5.3)   12/31/17  05:20    


 


Globulin  2.7 gm/dL  12/31/17  05:20    


 


Albumin/Globulin Ratio  1.4  (1.0-1.8)   12/31/17  05:20    


 


Triglycerides  262 mg/dL (<150)  H  12/30/17  16:55    


 


Cholesterol  142 mg/dL (<200)   12/30/17  16:55    


 


LDL Cholesterol Direct  72 mg/dL ()  L  12/30/17  16:55    


 


HDL Cholesterol  32 mg/dL (23-92)   12/30/17  16:55    


 


Serum Pregnancy, Qual  NEGATIVE  (NEGATIVE)   12/30/17  16:55    


 


Urine Source  MIDSTREAM   12/31/17  16:05    


 


Urine Color  YELLOW   12/31/17  16:05    


 


Urine Clarity  CLEAR  (CLEAR)   12/31/17  16:05    


 


Urine pH  6.0  (4.6 - 8.0)   12/31/17  16:05    


 


Ur Specific Gravity  1.020  (1.005-1.030)   12/31/17  16:05    


 


Urine Protein  NEGATIVE mg/dL (NEGATIVE)   12/31/17  16:05    


 


Urine Glucose (UA)  NEGATIVE mg/dL (NEGATIVE)   12/31/17  16:05    


 


Urine Ketones  NEGATIVE mg/dL (NEGATIVE)   12/31/17  16:05    


 


Urine Blood  NEGATIVE  (NEGATIVE)   12/31/17  16:05    


 


Urine Nitrate  NEGATIVE  (NEGATIVE)   12/31/17  16:05    


 


Urine Bilirubin  NEGATIVE  (NEGATIVE)   12/31/17  16:05    


 


Urine Urobilinogen  0.2 E.U./dL (0.2 - 1.0)   12/31/17  16:05    


 


Ur Leukocyte Esterase  NEGATIVE  (NEGATIVE)   12/31/17  16:05    


 


Urine RBC  NONE SEEN /hpf (0-5)   12/31/17  16:05    


 


Urine WBC  NONE SEEN /hpf (0-5)   12/31/17  16:05    


 


Ur Epithelial Cells  NONE SEEN /lpf (FEW)   12/31/17  16:05    


 


Urine Bacteria  NONE SEEN /hpf (NONE SEEN)   12/31/17  16:05    


 


Vancomycin Trough  13.1 ug/mL (10-20)   01/01/18  06:00    


 


Influenza A (Rapid)  NEG FOR INF A   12/30/17  23:00    


 


Influenza B (Rapid)  NEG FOR INF B   12/30/17  23:00    














- Physical Exam


Vitals and I&O: 


 Vital Signs











Temp  97.6 F   01/01/18 18:56


 


Pulse  90   01/01/18 18:56


 


Resp  20   01/01/18 18:56


 


BP  115/52   01/01/18 18:56


 


Pulse Ox  96   01/01/18 18:56








 Intake & Output











 01/01/18 01/01/18 01/02/18





 06:59 18:59 06:59


 


Intake Total 1100 250 


 


Balance 1100 250 


 


Weight (lbs) 104.236 kg 103.873 kg 


 


Intake:   


 


  Intake, IV Amount 1100  


 


    D5-0.45NS w/20 mEq KCL 1, 1000  





    000 ml @ 75 mls/hr IV .   





    [id number] [facility] Rx#:[id number]   


 


    Levofloxacin 500mg/100mL 100  





    500 mg In 100 ml @ 100   





    mls/hr IV Q24HR [facility] Rx#:   





    [id number]   


 


  Oral  250 


 


Other:   


 


  # Voids  3 


 


  # Bowel Movements  0 











Active Medications: 


Current Medications





Acetaminophen (Tylenol)  650 mg PO Q4H PRN


   PRN Reason: Fever > 101


   Stop: 02/28/18 19:14


   Last Admin: 01/01/18 04:30 Dose:  650 mg


Acetaminophen/Hydrocodone Bitart (Norco 5mg/325mg)  1 tab PO Q6H PRN


   PRN Reason: Pain (Moderate)


   Stop: 02/28/18 21:29


   Last Admin: 01/01/18 20:58 Dose:  1 tab


Albuterol Sulfate (Albuterol 2.5mg/3ml Neb Ud)  2.5 mg HHN Q6HRT [facility]


   Stop: 01/03/18 06:59


Albuterol/Ipratropium (Duoneb Neb)  3 ml HHN Q6HRT [facility]


   Stop: 02/28/18 19:29


   Last Admin: 01/01/18 18:46 Dose:  3 ml


Alprazolam (Xanax)  1 mg PO HS PRN; Protocol


   PRN Reason: Anxiety


   Stop: 02/28/18 21:30


   Last Admin: 12/31/17 21:51 Dose:  1 mg


Enoxaparin Sodium (Lovenox)  40 mg SUBQ Q12H ROJELIO


   Stop: 02/28/18 22:59


   Last Admin: 01/01/18 10:30 Dose:  40 mg


Hydroxyzine HCl (Atarax)  10 mg PO Q6H PRN; Protocol


   PRN Reason: Itching


   Stop: 03/01/18 17:15


   Last Admin: 12/31/17 18:12 Dose:  10 mg


Potassium Chloride/Dextrose/Sod Cl (D5-0.45ns W/20 Meq Kcl)  1,000 mls @ 75 mls/

hr IV .[id number] ROJELIO


   Stop: 02/28/18 19:16


   Last Admin: 12/31/17 21:58 Dose:  75 mls/hr


Levofloxacin (Levaquin Pb)  500 mg in 100 mls @ 100 mls/hr IV Q24HR ROJELIO


   Stop: 03/01/18 17:59


   Last Admin: 01/01/18 18:09 Dose:  100 mls/hr


Ipratropium Bromide (Atrovent Neb 0.5mg/2.5ml)  0.5 mg HHN Q6HRT ROJELIO


   Stop: 01/03/18 06:59


Lactobacillus Rhamnosus (Culturelle 15b)  1 each PO DAILY ROJELIO


   Stop: 03/02/18 09:59


   Last Admin: 01/01/18 10:32 Dose:  1 each


Miscellaneous (Vancomycin Iv Per Pharmacy)  1 ea  PRN ROJELIO


   Stop: 03/01/18 02:14


Miscellaneous (Probiotic Screen)  1 [facility] PRN PRN


   PRN Reason: PROTOCOL


   Stop: 03/01/18 10:29


Montelukast Sodium (Singulair)  10 mg PO DAILY ROJELIO


   Stop: 03/01/18 08:59


   Last Admin: 01/01/18 09:30 Dose:  10 mg


Prednisone (Prednisone)  3 mg PO DAILY ROJELIO


   Stop: 03/02/18 08:59


   Last Admin: 01/01/18 09:30 Dose:  3 mg








General: alert


HEENT: NC/AT, anicteric sclerae, throat clear


Neck: Supple, No JVD, No thyromegaly, +2 carotid pulse wo bruit, No LAD


Lungs: congested


Cardiovascular: RRR, Normal S1, Normal S2, without murmur


Abdomen: non-tender, non-distended


Extremities: clear


Neurological: no change





Internal Medicine Assmt/Plan





- Assessment


Assessment: 





1.PNEUMONIA


2.ASHMA





- Plan


Plan: 





CONTINUE ON CURRENT MEDICATION AND DIET.CBC AND CMP IN AM.

## 2018-01-01 NOTE — CONSULTATION
DATE OF CONSULTATION:  12/30/2017



HISTORY OF PRESENT ILLNESS:  A 57-year-old female was brought to the Emergency

Room with complaint of shortness of breath.  The patient is known to have

asthma.  Workup shows pneumonia.  Infectious consultation was called.  Discussed

with the staff, antibody adjusted.  The patient's examination was possible.



PAST MEDICAL HISTORY:  Low back pain, asthma, back surgery.



SOCIAL HISTORY:  Nonsmoker.



REVIEW OF SYSTEMS:  A 14-point review of system negative except above.



PHYSICAL EXAMINATION:

GENERAL:  The patient is well-nourished female.

VITAL SIGNS:  Temperature is 100.5, pulse 118, respirations 18, and blood

pressure 121/61.

HEENT:  Mild pallor.  No icterus.  No plaque.

NECK:  Supple.

LUNGS:  Breath sounds bilateral.

CARDIOVASCULAR:  ____.

ABDOMEN:  Soft, bowel sounds present.

LYMPHATICS:  No thyromegaly.  No cervical lymph nodes.



LABORATORY DATA:  White count is 16,000, hemoglobin is 13 grams, and platelets

400.  Serology influenza A and B negative.  Chest x-ray shows infiltrate.



DIAGNOSES:  Pneumonia and sepsis.  The patient started on vancomycin, Levaquin. 

Discontinued other antibiotic.  PENICILLIN allergy Avoid.  ERYTHROMYCIN allergy

avoid.  Cultures are pending.  Bronchodilator treatment.  Xanax for anxiety. 

Rest of the care as ordered in [facility]PO[facility].



Thank you Dr. Barrett for this consultation.  Rest of the care as ordered in

[facility]OE.





DD: 12/31/2017 13:04

DT: 01/01/2018 01:11

JOB# [id number]  [id number]

## 2018-01-02 LAB
ALBUMIN SERPL-MCNC: 3.8 GM/DL (ref 3.7–5.3)
ALBUMIN/GLOB SERPL: 1.3 {RATIO} (ref 1–1.8)
ALP SERPL-CCNC: 43 U/L (ref 34–104)
ALT SERPL-CCNC: 54 U/L (ref 7–52)
ANION GAP SERPL CALC-SCNC: 11.7 MMOL/L (ref 7–16)
ANION GAP SERPL CALC-SCNC: 12.8 MMOL/L (ref 7–16)
AST SERPL-CCNC: 33 U/L (ref 13–39)
BASOPHILS # BLD AUTO: 0.1 TH/CUMM (ref 0–0.2)
BASOPHILS NFR BLD AUTO: 0.7 % (ref 0–2)
BILIRUB SERPL-MCNC: 0.2 MG/DL (ref 0.3–1)
BUN SERPL-MCNC: 25 MG/DL (ref 7–25)
BUN SERPL-MCNC: 25 MG/DL (ref 7–25)
CALCIUM SERPL-MCNC: 9.2 MG/DL (ref 8.6–10.3)
CALCIUM SERPL-MCNC: 9.3 MG/DL (ref 8.6–10.3)
CHLORIDE SERPL-SCNC: 103 MEQ/L (ref 98–107)
CHLORIDE SERPL-SCNC: 104 MEQ/L (ref 98–107)
CO2 SERPL-SCNC: 26.1 MEQ/L (ref 21–31)
CO2 SERPL-SCNC: 26.2 MEQ/L (ref 21–31)
CREAT SERPL-MCNC: 0.9 MG/DL (ref 0.6–1.2)
CREAT SERPL-MCNC: 0.9 MG/DL (ref 0.6–1.2)
EOSINOPHIL # BLD AUTO: 2.7 TH/CMM (ref 0.1–0.4)
EOSINOPHIL NFR BLD AUTO: 21.8 % (ref 0–5)
ERYTHROCYTE [DISTWIDTH] IN BLOOD BY AUTOMATED COUNT: 14.1 % (ref 11.5–20)
GLOBULIN SER-MCNC: 2.9 GM/DL
GLUCOSE SERPL-MCNC: 93 MG/DL (ref 70–105)
GLUCOSE SERPL-MCNC: 93 MG/DL (ref 70–105)
HCT VFR BLD CALC: 32.8 % (ref 41–60)
HGB BLD-MCNC: 11.1 GM/DL (ref 12–16)
LYMPHOCYTE AB SER FC-ACNC: 3.2 TH/CMM (ref 1.5–3)
LYMPHOCYTES NFR BLD AUTO: 25.2 % (ref 20–50)
MCH RBC QN AUTO: 31.4 PG (ref 27–31)
MCHC RBC AUTO-ENTMCNC: 33.9 PG (ref 28–36)
MCV RBC AUTO: 92.8 FL (ref 81–100)
MONOCYTES # BLD AUTO: 1.2 TH/CMM (ref 0.3–1)
MONOCYTES NFR BLD AUTO: 9.9 % (ref 2–10)
NEUTROPHILS # BLD: 5.3 TH/CMM (ref 1.8–8)
NEUTROPHILS NFR BLD AUTO: 42.4 % (ref 40–80)
PLATELET # BLD: 358 TH/CMM (ref 150–400)
PMV BLD AUTO: 8.2 FL
POTASSIUM SERPL-SCNC: 3.9 MEQ/L (ref 3.5–5.1)
POTASSIUM SERPL-SCNC: 3.9 MEQ/L (ref 3.5–5.1)
RBC # BLD AUTO: 3.54 MIL/CMM (ref 3.8–5.1)
SODIUM SERPL-SCNC: 138 MEQ/L (ref 136–145)
SODIUM SERPL-SCNC: 138 MEQ/L (ref 136–145)
WBC # BLD AUTO: 12.5 TH/CMM (ref 4.8–10.8)

## 2018-01-02 RX ADMIN — ENOXAPARIN SODIUM SCH MG: 40 INJECTION SUBCUTANEOUS at 11:56

## 2018-01-02 RX ADMIN — IPRATROPIUM BROMIDE AND ALBUTEROL SULFATE SCH ML: .5; 3 SOLUTION RESPIRATORY (INHALATION) at 01:07

## 2018-01-02 RX ADMIN — Medication SCH EACH: at 08:12

## 2018-01-02 RX ADMIN — PREDNISONE SCH MG: 5 SOLUTION ORAL at 08:12

## 2018-01-02 NOTE — INFECTIOUS DISEASE PROG NOTE
Infectious Disease Subjective





- Review of Systems


Service Date: 01/02/18


Subjective: 





cc pneuminia


hpi- wbc decreased sp cx negative schedule fo r discharge po levaquin given


ros o fver


o/e vs chets claera bd soft ext pulse





Infectious Disease Objective





- Results


Result Diagrams: 


 01/02/18 06:00





 01/02/18 06:00


Recent Labs: 


 Laboratory Last Values











WBC  12.5 Th/cmm (4.8-10.8)  H D 01/02/18  06:00    


 


RBC  3.54 Mil/cmm (3.80-5.10)  L  01/02/18  06:00    


 


Hgb  11.1 gm/dL (12-16)  L  01/02/18  06:00    


 


Hct  32.8 % (41.0-60)  L  01/02/18  06:00    


 


MCV  92.8 fl ()   01/02/18  06:00    


 


MCH  31.4 pg (27.0-31.0)  H  01/02/18  06:00    


 


MCHC Differential  33.9 pg (28.0-36.0)   01/02/18  06:00    


 


RDW  14.1 % (11.5-20.0)   01/02/18  06:00    


 


Plt Count  358 Th/cmm (150-400)   01/02/18  06:00    


 


MPV  8.2 fl  01/02/18  06:00    


 


Neutrophils %  42.4 % (40.0-80.0)   01/02/18  06:00    


 


Lymphocytes %  25.2 % (20.0-50.0)   01/02/18  06:00    


 


Monocytes %  9.9 % (2.0-10.0)   01/02/18  06:00    


 


Eosinophils %  21.8 % (0.0-5.0)  H  01/02/18  06:00    


 


Basophils %  0.7 % (0.0-2.0)   01/02/18  06:00    


 


Neutrophils (Manual)  75 % (40-80)   12/30/17  16:55    


 


Lymphocytes  6 % (20-50)  L  12/30/17  16:55    


 


Monocytes  7 % (2-10)   12/30/17  16:55    


 


Eosinophils  12 % (0-5)  H  12/30/17  16:55    


 


PT  9.9 SECONDS (9.5-11.5)   12/30/17  16:55    


 


INR  0.95  (0.5-1.4)   12/30/17  16:55    


 


PTT (Actin FS)  27.5 SECONDS (26.0-38.0)   12/30/17  16:55    


 


D-Dimer  1380 ng/mL (100-400)  H  12/30/17  16:55    


 


Sodium  138 mEq/L (136-145)   01/02/18  06:00    


 


Potassium  3.9 mEq/L (3.5-5.1)   01/02/18  06:00    


 


Chloride  103 mEq/L ()   01/02/18  06:00    


 


Carbon Dioxide  26.1 mEq/L (21.0-31.0)   01/02/18  06:00    


 


Anion Gap  12.8  (7.0-16.0)   01/02/18  06:00    


 


BUN  25 mg/dL (7-25)   01/02/18  06:00    


 


Creatinine  0.9 mg/dL (0.6-1.2)   01/02/18  06:00    


 


Est GFR ( Amer)  > 60.0 ml/min (>90)   01/02/18  06:00    


 


Est GFR (Non-Af Amer)  > 60.0 ml/min  01/02/18  06:00    


 


BUN/Creatinine Ratio  27.8   01/02/18  06:00    


 


Glucose  93 mg/dL ()   01/02/18  06:00    


 


Whole Bld Lactic Acid  1.93 mmol/L (0.60-1.99)   12/30/17  16:55    


 


Calcium  9.3 mg/dL (8.6-10.3)   01/02/18  06:00    


 


Total Bilirubin  0.2 mg/dL (0.3-1.0)  L  01/02/18  06:00    


 


AST  33 U/L (13-39)   01/02/18  06:00    


 


ALT  54 U/L (7-52)  H  01/02/18  06:00    


 


Alkaline Phosphatase  43 U/L ()   01/02/18  06:00    


 


Creatine Kinase  87 U/L ()   12/30/17  16:55    


 


Troponin I  0.01 ng/mL (0.01-0.05)   12/30/17  16:55    


 


B-Natriuretic Peptide  14.7 pg/mL (5.0-100.0)   12/30/17  16:55    


 


Total Protein  6.7 gm/dL (6.0-8.3)   01/02/18  06:00    


 


Albumin  3.8 gm/dL (3.7-5.3)   01/02/18  06:00    


 


Globulin  2.9 gm/dL  01/02/18  06:00    


 


Albumin/Globulin Ratio  1.3  (1.0-1.8)   01/02/18  06:00    


 


Triglycerides  262 mg/dL (<150)  H  12/30/17  16:55    


 


Cholesterol  142 mg/dL (<200)   12/30/17  16:55    


 


LDL Cholesterol Direct  72 mg/dL ()  L  12/30/17  16:55    


 


HDL Cholesterol  32 mg/dL (23-92)   12/30/17  16:55    


 


Serum Pregnancy, Qual  NEGATIVE  (NEGATIVE)   12/30/17  16:55    


 


Urine Source  MIDSTREAM   12/31/17  16:05    


 


Urine Color  YELLOW   12/31/17  16:05    


 


Urine Clarity  CLEAR  (CLEAR)   12/31/17  16:05    


 


Urine pH  6.0  (4.6 - 8.0)   12/31/17  16:05    


 


Ur Specific Gravity  1.020  (1.005-1.030)   12/31/17  16:05    


 


Urine Protein  NEGATIVE mg/dL (NEGATIVE)   12/31/17  16:05    


 


Urine Glucose (UA)  NEGATIVE mg/dL (NEGATIVE)   12/31/17  16:05    


 


Urine Ketones  NEGATIVE mg/dL (NEGATIVE)   12/31/17  16:05    


 


Urine Blood  NEGATIVE  (NEGATIVE)   12/31/17  16:05    


 


Urine Nitrate  NEGATIVE  (NEGATIVE)   12/31/17  16:05    


 


Urine Bilirubin  NEGATIVE  (NEGATIVE)   12/31/17  16:05    


 


Urine Urobilinogen  0.2 E.U./dL (0.2 - 1.0)   12/31/17  16:05    


 


Ur Leukocyte Esterase  NEGATIVE  (NEGATIVE)   12/31/17  16:05    


 


Urine RBC  NONE SEEN /hpf (0-5)   12/31/17  16:05    


 


Urine WBC  NONE SEEN /hpf (0-5)   12/31/17  16:05    


 


Ur Epithelial Cells  NONE SEEN /lpf (FEW)   12/31/17  16:05    


 


Urine Bacteria  NONE SEEN /hpf (NONE SEEN)   12/31/17  16:05    


 


Vancomycin Trough  13.1 ug/mL (10-20)   01/01/18  06:00    


 


Random Vancomycin  10.4 ug/mL (5.0-40.0)   01/02/18  06:00    


 


Influenza A (Rapid)  NEG FOR INF A   12/30/17  23:00    


 


Influenza B (Rapid)  NEG FOR INF B   12/30/17  23:00    














- Physical Exam


Vitals and I&O: 


 Vital Signs











Temp  97.7 F   01/02/18 13:44


 


Pulse  85   01/02/18 13:44


 


Resp  18   01/02/18 13:44


 


BP  115/60   01/02/18 13:44


 


Pulse Ox  94   01/02/18 13:44








 Intake & Output











 01/01/18 01/02/18 01/02/18





 18:59 06:59 18:59


 


Intake Total 250  200


 


Balance 250  200


 


Weight (lbs) 103.873 kg  103.873 kg


 


Intake:   


 


  Oral 250  200


 


Other:   


 


  # Voids 3  3


 


  # Bowel Movements 0  0














Infectious Disease Assmt/Plan





- Problem List


Patient Problems: 


All Active Problems





COUGH AND BODY ACHES, LOW SAO2 (Acute)

## 2018-01-02 NOTE — DISCHARGE SUMMARY
DATE OF DISCHARGE:  01/02/2018



DATE OF DISCHARGE:  01/02/2018.



FINAL DIAGNOSES:

1.  Pneumonia.

2.  Asthma.

3.  Rheumatoid arthritis.

4.  Hyperlipidemia.



REVIEW OF HISTORY:  The patient is a 57-year-old female with a long history of

rheumatoid arthritis, asthma, hyperlipidemia, presented to the Emergency Room

with cough, shortness of breath and evaluated by the ER physician.  Initial

workup significant for pneumonia, admitted to the hospital.  Dr. ESSIE Agrawal

consulted on the case.



PHYSICAL EXAMINATION:

VITAL SIGNS:  Temperature was 100.5, heart rate 118, blood pressure 121/66.

CHEST:  Diminished breathing sound.

HEART:  S1, S2 normal.

ABDOMEN:  Soft, bowel sounds positive.

EXTREMITIES:  No edema.

NEUROLOGIC:  She was awake, alert, oriented.



LABORATORY DATA:  White blood 16.7, hemoglobin 13.5, hematocrit 40.1.  Sodium

134, potassium 4.3, BUN 25, creatinine 0.4.  The patient is started on IV

antibiotic, breathing treatment 2 liter nasal cannula oxygen.



COURSE OF HOSPITALIZATION:  During hospitalization, the patient was seen by Dr. Louis Agrawal, Infectious Disease and he continued her antibiotic.  On

12/30/2017, the patient still has congestion and shortness of breath, chest,

mild diminished breathing sound.  Heart, S1, S2 normal, abdomen, soft, bowel

sounds positive.  White blood cells _____ hemoglobin 11.5.



PHYSICAL EXAMINATION:

CHEST:  Diminished breathing sound.

HEART:  S1, S2 normal.

ABDOMEN:  Soft, bowel sounds positive. 

GENERAL:  On 01/02/2018, the patient feels well, no shortness of breath, less

cough, no fever, temperature 97.7, heart rate 85.

CHEST:  Clear to auscultation.

ABDOMEN:  Soft, bowel sounds positive.

EXTREMITIES:  No edema.

NEUROLOGIC:  Awake, alert, oriented.  No focal motor or sensory deficits. 

Cranial nerves 2-12 is intact.



LABORATORY DATA:  White blood cell 12.5, hemoglobin 11.1.  Sodium 138, potassium

3.9.



DISPOSITION:  The patient discharged home on Levaquin 750 daily for 7 days. 

Follow up with primary physician as outpatient.



CONDITION ON DISCHARGE:  Stable.





DD: 01/02/2018 13:40

DT: 01/02/2018 14:19

JOB# [id number]  [id number]